# Patient Record
Sex: MALE | Race: WHITE | Employment: FULL TIME | URBAN - METROPOLITAN AREA
[De-identification: names, ages, dates, MRNs, and addresses within clinical notes are randomized per-mention and may not be internally consistent; named-entity substitution may affect disease eponyms.]

---

## 2022-10-21 DIAGNOSIS — R20.2 PARESTHESIA: Primary | ICD-10-CM

## 2022-10-24 ENCOUNTER — OFFICE VISIT (OUTPATIENT)
Dept: PHYSICAL MEDICINE AND REHAB | Age: 42
End: 2022-10-24
Payer: COMMERCIAL

## 2022-10-24 VITALS — WEIGHT: 150 LBS | BODY MASS INDEX: 24.11 KG/M2 | HEIGHT: 66 IN

## 2022-10-24 DIAGNOSIS — R20.2 PARESTHESIA: Primary | ICD-10-CM

## 2022-10-24 PROCEDURE — 95910 NRV CNDJ TEST 7-8 STUDIES: CPT | Performed by: PHYSICAL MEDICINE & REHABILITATION

## 2022-10-24 PROCEDURE — 95886 MUSC TEST DONE W/N TEST COMP: CPT | Performed by: PHYSICAL MEDICINE & REHABILITATION

## 2022-10-24 PROCEDURE — 99202 OFFICE O/P NEW SF 15 MIN: CPT | Performed by: PHYSICAL MEDICINE & REHABILITATION

## 2022-10-24 NOTE — PATIENT INSTRUCTIONS
Electrodiagnotic Laboratory  Accredited by the [facility] with Exemplary status  WESTON Valadez D.O. [facility]  [address] [facility] [place]  Phone: [phone]  Fax: [phone]        Today you had an electrodiagnostic exam which included nerve conduction studies (NCS) and electromyography (EMG). This test evaluated the electrical activity of your nerves and muscles to help determine if you have a nerve or muscle disease. This test can help determine the location and type of a nerve or muscle problem. This will help your referring doctor diagnose your condition and determine the appropriate next step in your treatment plan. After your test:    1. There are no long lasting side effects of the test.     2. You may resume your normal activities without restrictions. 3.  Resume any medications that were stopped for the test.     4  If you have sore areas or bruising in your muscles where the needle was placed, apply a cold pack to the sore area for 15-20 minutes three to four times a day as needed for pain. The soreness should go away in about 1-2 days. 5. Your results were provided  Briefly at the end of your test and the final detailed report will be provided to your referring physician, and/or primary care physician and any other parties you requested within 1-2 days of the examination. You may wish to contact your referring provider after a few days to determine what they would like you to do next. 6.  Please call [phone] with any questions or concerns and if you develop increased body temperature/fever, swelling, tenderness, increased pain and/or drainage from the sites where the needle was placed. Thank you for choosing us for your health care needs.

## 2022-10-24 NOTE — PROGRESS NOTES
[address] [facility]  Electrodiagnostic Laboratory  *Accredited by the [address] [place] [address] with exemplary status  [address] [facility] [place]  Phone: [phone]  Fax: [phone]    Referring Provider: Izabela Nolasco, *  Primary Care Physician: No primary care provider on file. Patient Name: Tatyana Marie  Patient YOB: 1980  Gender: male  BMI: Body mass index is 24.21 kg/m². Height 5' 6\" (1.676 m), weight 150 lb (68 kg). 10/24/2022    Reason for Referral: Paresthesia    Description of clinical problem:   Chief Complaint   Patient presents with    Extremity Pain     Pain in the forearm into the elbow. Numbness     Numbness/tingling in the last two fingers. Right is worse than left. 1 year of symp. Extremity Weakness     Decrease  strength. Brief physical exam:   Sensory deficit Yes- decreased sensation LT right ulnar distribution; Weakness No; Atrophy  No; Reflex abnormality No    Sensory NCS      Nerve / Sites Rec. Site Peak Lat PP Amp Segments Distance Velocity Temp. ms µV  cm m/s °C   R Median - Digit II (Antidromic)      Palm Dig II 1.82 70.1 Palm - Dig II 7 64 32.4      Wrist Dig II 3.07 67.0 Wrist - Dig II 14 58 32.4   R Ulnar - Digit V (Antidromic)      Wrist Dig V 3.18 54.7 Wrist - Dig V 14 58 32.4   R Radial - Anatomical snuff box (Forearm)      Forearm Wrist 2.40 30.5 Forearm - Wrist 10 58 32.4   R Dorsal ulnar cutaneous - Hand dorsum (Forearm)      Forearm Hand dorsum 1.98 28.1 Forearm - Hand dorsum 8 55 32.4       Motor NCS      Nerve / Sites Muscle Onset Amplitude Segments Distance Velocity Temp.     ms mV  cm m/s °C   R Median - APB      Palm APB 1.93 14.0 Palm - APB   32.2      Wrist APB 3.39 11.7 Wrist - Palm 8 55 32.2      Elbow APB 7.14 11.5 Elbow - Wrist 21 56 32.2   R Ulnar - ADM      Wrist ADM 2.76 13.1 Wrist - ADM 8  32.3      B. Elbow ADM 5.78 12.8 B. Elbow - Wrist 20 66 32.3      A. Elbow ADM 7.50 12.3 A. Elbow - B. Elbow 10 58 32.3 R Ulnar - FDI      Wrist FDI 3.18 13.1 Wrist - FDI   32.4      B. Elbow FDI 6.51 13.2 B. Elbow - Wrist 20 60 32.4      A. Elbow FDI 8.49 13.2 A. Elbow - B. Elbow 10 51 32.4       F  Wave      Nerve Fmin % F    ms %   R Median - APB 25.73 50   R Ulnar - ADM 25.68 40       EMG      EMG Summary Table     Spontaneous MUAP Recruitment   Muscle Nerve Roots IA Fib PSW Fasc Amp Dur. PPP Pattern   R. Biceps brachii Musculocutaneous C5-C6 N None None None N N N N   R. Triceps brachii Radial C6-C8 N None None None N N N N   R. Pronator teres Median C6-C7 N None None None N N N N   R. Flexor carpi ulnaris Ulnar C7-T1 N None None None N N N N   R. First dorsal interosseous Ulnar C8-T1 N None None None N N N N   R. Abductor pollicis brevis Median W2-W7 N None None None N N N N   R. Cervical paraspinals (low)  - N None None None N N N N     Study Limitations:  none     Summary of Findings:   Nerve conduction studies: The following nerve conduction studies were abnormal: none. All nerve conduction studies, as listed in the table were normal in latency, amplitude and conduction velocity. Needle EMG:   Needle EMG was performed using a concentric needle. The following abnormalities were seen on needle EMG: none. All observed motor units were normal in amplitude, duration, phases and recruitment and no active denervation signs were seen. Diagnostic Interpretation: This study was normal.     There is no electrodiagnostic evidence for any peripheral nerve mononeuropathy, plexopathy or C5-T1 motor radiculopathy in the right upper extremity. Previous Study: There is not a prior study for comparison. Follow up EMG is recommended if clinically indicated.      Technologist: Aniyah Pineda  Physician:Leanna Lion-DO Neal, [address] [place] [address]   Board Certified Physical Medicine and Rehabilitation      Nerve conduction studies and electromyography were performed according to our laboratory policies and procedures which can be provided upon request. All abnormal values are identified in the table. Laboratory normal values can also be provided upon request.       Cc: Lynne Zhang [facility], *  No primary care provider on file.

## 2022-10-24 NOTE — PROGRESS NOTES
Eric  22.  Electrodiagnostic Laboratory  *Acc[facility] the [facility] with exemplary status  [address] [facility] [place]  Phone: [phone]  Fax: [phone]      Date of Examination: 10/24/22  Patient Name: Onelia Smyth    Chief Complaint   Patient presents with    Extremity Pain     Pain in the forearm into the elbow. Numbness     Numbness/tingling in the last two fingers. Right is worse than left. 1 year of symp. Extremity Weakness     Decrease  strength. Onelia Smyth  is a 37y.o. year old male who was seen due to complaints of numbness in right D4-5 that has been present for 1 year and started after no injury. Physical Exam: MSK: There is no joint effusion, deformity, instability, swelling, erythema or warmth. AROM is full in the spine and extremities. Neurologic: decreased sensation right ulnar distribution. No focal motor deficit. Reflexes 2+ and symmetric. Gait is normal. +tinel's right elbow. Impression:     1. Paresthesia          Plan:   EMG is indicated to evaluate the above diagnosis. EMG was done today and was normal. The patient was educated about the diagnosis and the prognosis. Recommend - discussed cubital tunnel treatment and things to avoid to see if symptoms improve. He is seeing NSGY tomorrow and will likely get MRI of cervical spine. Advised patient to follow up with referring provider. Thank you for allowing me to participate in the care of your patient.       Sincerely,     Chula Martinez DO, [facility]   Board Certified Physical Medicine and Rehabilitation

## 2022-10-26 DIAGNOSIS — R20.2 PARESTHESIA: ICD-10-CM
